# Patient Record
Sex: FEMALE | Race: WHITE | URBAN - METROPOLITAN AREA
[De-identification: names, ages, dates, MRNs, and addresses within clinical notes are randomized per-mention and may not be internally consistent; named-entity substitution may affect disease eponyms.]

---

## 2022-04-10 ENCOUNTER — HISTORICAL (OUTPATIENT)
Dept: ADMINISTRATIVE | Facility: HOSPITAL | Age: 62
End: 2022-04-10

## 2022-04-29 VITALS
BODY MASS INDEX: 20.66 KG/M2 | SYSTOLIC BLOOD PRESSURE: 100 MMHG | HEIGHT: 64 IN | WEIGHT: 121 LBS | DIASTOLIC BLOOD PRESSURE: 70 MMHG

## 2024-03-08 DIAGNOSIS — G43.909 MIGRAINES: Primary | ICD-10-CM

## 2024-04-23 ENCOUNTER — TELEPHONE (OUTPATIENT)
Dept: NEUROLOGY | Facility: CLINIC | Age: 64
End: 2024-04-23
Payer: MEDICARE

## 2024-08-20 ENCOUNTER — OFFICE VISIT (OUTPATIENT)
Dept: NEUROLOGY | Facility: CLINIC | Age: 64
End: 2024-08-20
Payer: MEDICARE

## 2024-08-20 VITALS
BODY MASS INDEX: 25.27 KG/M2 | HEIGHT: 64 IN | WEIGHT: 148 LBS | SYSTOLIC BLOOD PRESSURE: 112 MMHG | DIASTOLIC BLOOD PRESSURE: 74 MMHG

## 2024-08-20 DIAGNOSIS — M48.02 SPINAL STENOSIS, CERVICAL REGION: ICD-10-CM

## 2024-08-20 DIAGNOSIS — M47.812 CERVICAL SPONDYLOSIS: ICD-10-CM

## 2024-08-20 DIAGNOSIS — G43.909 MIGRAINES: ICD-10-CM

## 2024-08-20 DIAGNOSIS — G47.19 EXCESSIVE DAYTIME SLEEPINESS: ICD-10-CM

## 2024-08-20 DIAGNOSIS — M54.2 CERVICALGIA: ICD-10-CM

## 2024-08-20 DIAGNOSIS — G43.719 INTRACTABLE CHRONIC MIGRAINE WITHOUT AURA AND WITHOUT STATUS MIGRAINOSUS: Primary | ICD-10-CM

## 2024-08-20 PROCEDURE — 99205 OFFICE O/P NEW HI 60 MIN: CPT | Mod: S$PBB,,, | Performed by: SPECIALIST

## 2024-08-20 PROCEDURE — 99213 OFFICE O/P EST LOW 20 MIN: CPT | Mod: PBBFAC | Performed by: SPECIALIST

## 2024-08-20 PROCEDURE — 99999 PR PBB SHADOW E&M-EST. PATIENT-LVL III: CPT | Mod: PBBFAC,,, | Performed by: SPECIALIST

## 2024-08-20 RX ORDER — AMOXICILLIN 500 MG
1200 CAPSULE ORAL DAILY
COMMUNITY

## 2024-08-20 RX ORDER — AMPICILLIN TRIHYDRATE 250 MG
CAPSULE ORAL
COMMUNITY

## 2024-08-20 RX ORDER — CLONAZEPAM 2 MG/1
2 TABLET ORAL NIGHTLY
COMMUNITY
Start: 2024-08-20 | End: 2024-09-19

## 2024-08-20 RX ORDER — ACETAMINOPHEN 500 MG
1 TABLET ORAL EVERY MORNING
COMMUNITY

## 2024-08-20 RX ORDER — VIT C/E/ZN/COPPR/LUTEIN/ZEAXAN 250MG-90MG
2000 CAPSULE ORAL DAILY
COMMUNITY

## 2024-08-20 RX ORDER — SUMATRIPTAN SUCCINATE 100 MG/1
100 TABLET ORAL
COMMUNITY

## 2024-08-20 RX ORDER — DEXTROAMPHETAMINE SACCHARATE, AMPHETAMINE ASPARTATE MONOHYDRATE, DEXTROAMPHETAMINE SULFATE AND AMPHETAMINE SULFATE 5; 5; 5; 5 MG/1; MG/1; MG/1; MG/1
20 CAPSULE, EXTENDED RELEASE ORAL EVERY MORNING
COMMUNITY
Start: 2024-10-21 | End: 2024-11-20
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
## 2024-09-06 ENCOUNTER — APPOINTMENT (OUTPATIENT)
Dept: RADIOLOGY | Facility: HOSPITAL | Age: 64
End: 2024-09-06
Attending: SPECIALIST
Payer: MEDICARE

## 2024-09-06 DIAGNOSIS — M54.2 CERVICALGIA: ICD-10-CM

## 2024-09-06 DIAGNOSIS — M48.02 SPINAL STENOSIS, CERVICAL REGION: ICD-10-CM

## 2024-09-06 PROCEDURE — 72141 MRI NECK SPINE W/O DYE: CPT | Mod: TC

## 2024-11-13 ENCOUNTER — OFFICE VISIT (OUTPATIENT)
Dept: NEUROLOGY | Facility: CLINIC | Age: 64
End: 2024-11-13
Payer: MEDICARE

## 2024-11-13 DIAGNOSIS — G43.719 INTRACTABLE CHRONIC MIGRAINE WITHOUT AURA AND WITHOUT STATUS MIGRAINOSUS: Primary | ICD-10-CM

## 2024-11-13 DIAGNOSIS — F51.04 CHRONIC INSOMNIA: ICD-10-CM

## 2024-11-13 DIAGNOSIS — G47.19 EXCESSIVE DAYTIME SLEEPINESS: ICD-10-CM

## 2024-11-13 PROCEDURE — 99214 OFFICE O/P EST MOD 30 MIN: CPT | Mod: 95,,, | Performed by: SPECIALIST

## 2024-11-13 RX ORDER — AMITRIPTYLINE HYDROCHLORIDE 25 MG/1
25 TABLET, FILM COATED ORAL NIGHTLY
Qty: 30 TABLET | Refills: 11 | Status: SHIPPED | OUTPATIENT
Start: 2024-11-13 | End: 2025-11-13

## 2024-11-13 NOTE — PROGRESS NOTES
This is a telemedicine note. See bottom of note for boilerplate elements.     Radha Alexander is a 64 y.o. female seen today via telemedicine visit.   Subjective:    Patient ID: Radha Alexander is a 64 y.o. female.  Chief Complaint: virtual follow up for dx or symptoms: HA, c sp mri results     HPI:         HA's fewer in the past mo but recalls in recent months was having 20 or more / mo           5-6 and 6-7 disc pathology but no surgical interv needed in my view             Current Outpatient Medications   Medication Instructions    calcium carbonate-vitamin D3 600 mg-20 mcg (800 unit) Tab 1 tablet, Oral, Every morning    cholecalciferol (vitamin D3) (VITAMIN D3) 2,000 Units, Oral, Daily    clonazePAM (KLONOPIN) 2 mg, Oral, Nightly    dextroamphetamine-amphetamine (ADDERALL XR) 20 MG 24 hr capsule 20 mg, Oral, Every morning    multivitamin/iron/folic acid (CENTRUM ULTRA WOMEN'S ORAL) Oral    mv-mn/om3/dha/epa/fish/lut/cele (OCUVITE ADULT 50 PLUS ORAL) Oral    omega-3 fatty acids/fish oil (FISH OIL-OMEGA-3 FATTY ACIDS) 300-1,000 mg capsule 1,200 mg, Oral, Daily    red yeast rice 600 mg Cap Oral    sumatriptan (IMITREX) 100 mg, Oral, Every 2 hours PRN        Objective:      Exam Limited due to telemedicine restrictions.  There were no vitals taken for this visit.  if accompanied, by:_ n   Speech: nl     Neuroimaging:  Images and imaging reports reviewed.  My comments: c5-6 and 6-7 disc pathology but no surgical interv needed in my view     Labs:    meds:    Complexity of Data:     [x] High  [] Moderate   Risks:   [] High   [] Moderate   MDM:      [] High   [x] Moderate   Assessment/Plan:     Problem List Items Addressed This Visit          Neuro    Intractable chronic migraine without aura and without status migrainosus - Primary       Other    Excessive daytime sleepiness    Chronic insomnia       Other comments/ follow up:      Medications Ordered This Encounter   Medications    amitriptyline (ELAVIL) 25 MG  tablet     Sig: Take 1 tablet (25 mg total) by mouth every evening.     Dispense:  30 tablet     Refill:  11     Cut nightly clonazepam from 2mg to 1mg please   Aim virtual revisit febr   Video Time Documentation:  Spent 9 minutes with patient over video discussing health concerns.   Total time this visit:    12  minutes    This is a telemedicine note.   Patient was treated using telemedicine, real time audio and video, according to [facility] protocols. This visit is not recorded.  ISkip MD, conducted the visit from the Neurology clinic of [facility]. The patient participated in the visit at a non-[facility] location selected by the patient, [facility].   I am licensed in [place] where the patient stated they are located. The patient stated that they understood and accepted the privacy and security risks to their information at their location.

## 2025-02-11 ENCOUNTER — OFFICE VISIT (OUTPATIENT)
Dept: NEUROLOGY | Facility: CLINIC | Age: 65
End: 2025-02-11
Payer: MEDICARE

## 2025-02-11 DIAGNOSIS — G43.719 INTRACTABLE CHRONIC MIGRAINE WITHOUT AURA AND WITHOUT STATUS MIGRAINOSUS: Primary | ICD-10-CM

## 2025-02-11 DIAGNOSIS — G47.19 EXCESSIVE DAYTIME SLEEPINESS: ICD-10-CM

## 2025-02-11 DIAGNOSIS — F51.04 CHRONIC INSOMNIA: ICD-10-CM

## 2025-02-11 DIAGNOSIS — M47.812 CERVICAL SPONDYLOSIS: ICD-10-CM

## 2025-02-11 PROCEDURE — 98005 SYNCH AUDIO-VIDEO EST LOW 20: CPT | Mod: 95,,, | Performed by: SPECIALIST

## 2025-02-11 NOTE — PROGRESS NOTES
"This is a telemedicine note. See bottom of note for boilerplate elements.     Radha Alexander is a 65 y.o. female seen today via telemedicine visit.   Subjective:    Patient ID: Radha Alexander is a 65 y.o. female.  Chief Complaint: virtual follow up for dx or symptoms: HA; EDS; chr insomnia    HPI:         HA's are fewer and further betw       filled 10 sumatrip Jan 18 and still 6 left   "I can plan to do things and make it there"     8.24 [facility] 1st vis: HA     Current Outpatient Medications   Medication Instructions    amitriptyline (ELAVIL) 25 mg, Oral, Nightly    calcium carbonate-vitamin D3 600 mg-20 mcg (800 unit) Tab 1 tablet, Oral, Every morning    cholecalciferol (vitamin D3) (VITAMIN D3) 2,000 Units, Oral, Daily    clonazePAM (KLONOPIN) 2 mg, Oral, Nightly    dextroamphetamine-amphetamine (ADDERALL XR) 20 MG 24 hr capsule 20 mg, Oral, Every morning    multivitamin/iron/folic acid (CENTRUM ULTRA WOMEN'S ORAL) Oral    mv-mn/om3/dha/epa/fish/lut/cele (OCUVITE ADULT 50 PLUS ORAL) Oral    omega-3 fatty acids/fish oil (FISH OIL-OMEGA-3 FATTY ACIDS) 300-1,000 mg capsule 1,200 mg, Oral, Daily    red yeast rice 600 mg Cap Oral    sumatriptan (IMITREX) 100 mg, Oral, Every 2 hours PRN        Objective:      Exam Limited due to telemedicine restrictions.  There were no vitals taken for this visit.  if accompanied, by:_ n  Speech: nl   CN's appear normal over video feed     Neuroimaging: Images and imaging reports had been reviewed.  My prior comments:   9.24 c sp c5-6 and 6-7 disc pathology but no surgical interv needed in my view;       Assessment/Plan:     Problem List Items Addressed This Visit          Neuro    Intractable chronic migraine without aura and without status migrainosus - Primary    Cervical spondylosis       Other    Excessive daytime sleepiness    Chronic insomnia       Other comments/ follow up:       Cont pres mgmt   Aim virtual revisit 6 mos       Video Time Documentation:  Spent 3 minutes with " patient over video discussing health concerns.   Total time this visit:     5 minutes    This is a telemedicine note.   Patient was treated using telemedicine, real time audio and video, according to [facility] protocols. This visit is not recorded.  The patient participated in the visit at a non-[facility] location selected by the patient, [facility].   I am licensed in [place] where the patient stated they are located.

## 2025-08-07 ENCOUNTER — OFFICE VISIT (OUTPATIENT)
Dept: NEUROLOGY | Facility: CLINIC | Age: 65
End: 2025-08-07
Payer: MEDICARE

## 2025-08-07 DIAGNOSIS — F51.04 CHRONIC INSOMNIA: ICD-10-CM

## 2025-08-07 DIAGNOSIS — G43.719 INTRACTABLE CHRONIC MIGRAINE WITHOUT AURA AND WITHOUT STATUS MIGRAINOSUS: Primary | ICD-10-CM

## 2025-08-07 DIAGNOSIS — G47.19 EXCESSIVE DAYTIME SLEEPINESS: ICD-10-CM

## 2025-08-07 DIAGNOSIS — M47.812 CERVICAL SPONDYLOSIS: ICD-10-CM

## 2025-08-07 PROCEDURE — 98005 SYNCH AUDIO-VIDEO EST LOW 20: CPT | Mod: 95,,, | Performed by: SPECIALIST

## 2025-08-07 NOTE — PROGRESS NOTES
"This is a telemedicine note. See bottom of note for boilerplate elements.     Radha Alexander is a 65 y.o. female seen today via telemedicine visit.   Subjective:    Patient ID: Radha Alexander is a 65 y.o. female.  Chief Complaint: virtual follow up for dx or symptoms: migraine     HPI:         has hand full of severe migraines per month   "Exciting" to have fewer HA's and her #9 imitrex last greater than one month   Takes half of clonazepam 2mg nightly so 1mg nightly   Always tired   Has L greater than R tinnitus   Some dizziness w position changes     2.2025 last visit; 8.24 [facility] 1st vis     Current Outpatient Medications   Medication Instructions    amitriptyline (ELAVIL) 25 mg, Oral, Nightly    calcium carbonate-vitamin D3 600 mg-20 mcg (800 unit) Tab 1 tablet, Oral, Every morning    cholecalciferol (vitamin D3) (VITAMIN D3) 2,000 Units, Oral, Daily    clonazePAM (KLONOPIN) 2 mg, Oral, Nightly    dextroamphetamine-amphetamine (ADDERALL XR) 20 MG 24 hr capsule 20 mg, Oral, Every morning    multivitamin/iron/folic acid (CENTRUM ULTRA WOMEN'S ORAL) Oral    mv-mn/om3/dha/epa/fish/lut/cele (OCUVITE ADULT 50 PLUS ORAL) Oral    omega-3 fatty acids/fish oil (FISH OIL-OMEGA-3 FATTY ACIDS) 300-1,000 mg capsule 1,200 mg, Oral, Daily    red yeast rice 600 mg Cap Oral    sumatriptan (IMITREX) 100 mg, Oral, Every 2 hours PRN        Objective:      Exam Limited due to telemedicine restrictions.  There were no vitals taken for this visit.  if accompanied, by:_ n  Speech: normal   CN's grossly ok over video     Neuroimaging:  Prior comments:   9.24 c sp c5-6 and 6-7 disc pathology but no surgical interv needed in my view;    MRI b w and wo normal     MRA reportedly normal       Assessment/Plan:     Problem List Items Addressed This Visit          Neuro    Intractable chronic migraine without aura and without status migrainosus - Primary    Cervical spondylosis       Other    Excessive daytime sleepiness    Chronic insomnia "       Other comments/ follow up:    General orders, other than meds:      Long term risks of nightly clonazepam and nightly amitrip discussed but her HA's are better so reluctant to change the amitrip   However would be good goal to wean off clonazepam if possible   Ask that she fu with her primary   Call here if needed        Video Time Documentation:  Spent 6 minutes with patient over video discussing health concerns.   Total time this visit:    8  minutes    This is a telemedicine note.   Patient was treated using telemedicine, real time audio and video, according to [facility] protocols. This visit is not recorded.  The patient participated in the visit at a non-[facility] location selected by the patient, [facility].   I am licensed in [place] where the patient stated they are located.

## 2025-08-23 ENCOUNTER — PATIENT MESSAGE (OUTPATIENT)
Dept: RESEARCH | Facility: HOSPITAL | Age: 65
End: 2025-08-23
Payer: MEDICARE